# Patient Record
Sex: FEMALE | Race: WHITE | NOT HISPANIC OR LATINO | URBAN - METROPOLITAN AREA
[De-identification: names, ages, dates, MRNs, and addresses within clinical notes are randomized per-mention and may not be internally consistent; named-entity substitution may affect disease eponyms.]

---

## 2018-04-16 ENCOUNTER — OFFICE (OUTPATIENT)
Dept: URBAN - METROPOLITAN AREA CLINIC 33 | Facility: CLINIC | Age: 73
End: 2018-04-16
Payer: OTHER GOVERNMENT

## 2018-04-16 ENCOUNTER — OFFICE (OUTPATIENT)
Dept: URBAN - METROPOLITAN AREA CLINIC 33 | Facility: CLINIC | Age: 73
End: 2018-04-16

## 2018-04-16 VITALS
HEIGHT: 63 IN | HEART RATE: 47 BPM | DIASTOLIC BLOOD PRESSURE: 65 MMHG | TEMPERATURE: 97.2 F | SYSTOLIC BLOOD PRESSURE: 107 MMHG

## 2018-04-16 DIAGNOSIS — G47.30 SLEEP APNEA, UNSPECIFIED: ICD-10-CM

## 2018-04-16 DIAGNOSIS — I48.91 UNSPECIFIED ATRIAL FIBRILLATION: ICD-10-CM

## 2018-04-16 DIAGNOSIS — Z86.010 PERSONAL HISTORY OF COLONIC POLYPS: ICD-10-CM

## 2018-04-16 PROCEDURE — 99203 OFFICE O/P NEW LOW 30 MIN: CPT

## 2018-04-16 PROCEDURE — 00031: CPT

## 2018-04-16 NOTE — SERVICEHPINOTES
SATHYA FINK   is a   72   year old    female who is being seen in consultation at the request of   IRINA BULLOCK   for OV prior to colonoscopy. She denies any GI issues.  BMs once daily, BSS type 4. She denies change in bowel habits, blood in the stool, n/v, abdominal pain, weight loss, fever, or chills. No family hx of colon cancer or polyps. Her last colonoscopy was 5/2013, no polyps, recall 5 years--she has a hx of polyps in 2008.  She has a fib, currently on Eliquis. She had cardiac ablation in 2012 and again in February of 2017. Sees cardiologist, Dr Shashi Salcido, in [place] every 6 months. Ney also has sleep apnea and uses a CPAP. She denies chest pain or SOB. BR